# Patient Record
Sex: FEMALE | URBAN - METROPOLITAN AREA
[De-identification: names, ages, dates, MRNs, and addresses within clinical notes are randomized per-mention and may not be internally consistent; named-entity substitution may affect disease eponyms.]

---

## 2020-07-21 ENCOUNTER — TELEPHONE ENCOUNTER (OUTPATIENT)
Dept: URBAN - METROPOLITAN AREA CLINIC 92 | Facility: CLINIC | Age: 45
End: 2020-07-21

## 2021-01-26 ENCOUNTER — WEB ENCOUNTER (OUTPATIENT)
Dept: URBAN - METROPOLITAN AREA CLINIC 80 | Facility: CLINIC | Age: 46
End: 2021-01-26

## 2021-01-27 ENCOUNTER — WEB ENCOUNTER (OUTPATIENT)
Dept: URBAN - METROPOLITAN AREA CLINIC 80 | Facility: CLINIC | Age: 46
End: 2021-01-27

## 2021-04-12 ENCOUNTER — ERX REFILL RESPONSE (OUTPATIENT)
Dept: URBAN - METROPOLITAN AREA CLINIC 96 | Facility: CLINIC | Age: 46
End: 2021-04-12

## 2021-04-12 RX ORDER — MESALAMINE 375 MG/1
TAKE 4 CAPSULES BY MOUTH EVERY MORNING CAPSULE, EXTENDED RELEASE ORAL
Qty: 360 | Refills: 0

## 2021-07-21 ENCOUNTER — ERX REFILL RESPONSE (OUTPATIENT)
Dept: URBAN - METROPOLITAN AREA CLINIC 96 | Facility: CLINIC | Age: 46
End: 2021-07-21

## 2021-07-21 RX ORDER — MESALAMINE 375 MG/1
TAKE 4 CAPSULES BY MOUTH EVERY MORNING 90 CAPSULE, EXTENDED RELEASE ORAL
Qty: 360 CAPSULE | Refills: 1 | OUTPATIENT

## 2021-07-21 RX ORDER — MESALAMINE 375 MG/1
TAKE 4 CAPSULES BY MOUTH EVERY MORNING CAPSULE, EXTENDED RELEASE ORAL
Qty: 360 | Refills: 0 | OUTPATIENT

## 2021-10-25 ENCOUNTER — TELEPHONE ENCOUNTER (OUTPATIENT)
Dept: URBAN - METROPOLITAN AREA CLINIC 79 | Facility: CLINIC | Age: 46
End: 2021-10-25

## 2021-10-25 RX ORDER — MESALAMINE 375 MG/1
TAKE 4 CAPSULES BY MOUTH EVERY MORNING 90 CAPSULE, EXTENDED RELEASE ORAL ONCE A DAY
Qty: 90 | Refills: 0

## 2021-11-15 ENCOUNTER — WEB ENCOUNTER (OUTPATIENT)
Dept: URBAN - METROPOLITAN AREA CLINIC 80 | Facility: CLINIC | Age: 46
End: 2021-11-15

## 2021-11-18 ENCOUNTER — OFFICE VISIT (OUTPATIENT)
Dept: URBAN - METROPOLITAN AREA CLINIC 80 | Facility: CLINIC | Age: 46
End: 2021-11-18
Payer: COMMERCIAL

## 2021-11-18 ENCOUNTER — WEB ENCOUNTER (OUTPATIENT)
Dept: URBAN - METROPOLITAN AREA CLINIC 80 | Facility: CLINIC | Age: 46
End: 2021-11-18

## 2021-11-18 ENCOUNTER — LAB OUTSIDE AN ENCOUNTER (OUTPATIENT)
Dept: URBAN - METROPOLITAN AREA CLINIC 80 | Facility: CLINIC | Age: 46
End: 2021-11-18

## 2021-11-18 DIAGNOSIS — K51.00 ULCERATIVE PANCOLITIS WITHOUT COMPLICATION: ICD-10-CM

## 2021-11-18 PROCEDURE — 99213 OFFICE O/P EST LOW 20 MIN: CPT | Performed by: PHYSICIAN ASSISTANT

## 2021-11-18 RX ORDER — TURMERIC 400 MG
CAPSULE ORAL
Qty: 0 | Refills: 0 | Status: DISCONTINUED | COMMUNITY
Start: 1900-01-01

## 2021-11-18 RX ORDER — SODIUM SULFATE, MAGNESIUM SULFATE, AND POTASSIUM CHLORIDE 17.75; 2.7; 2.25 G/1; G/1; G/1
12 TABLETS TABLET ORAL
Qty: 24 TABLETS | Refills: 0 | OUTPATIENT
Start: 2021-11-18 | End: 2021-11-19

## 2021-11-18 RX ORDER — MESALAMINE 375 MG/1
TAKE 4 CAPSULES BY MOUTH EVERY MORNING 90 CAPSULE, EXTENDED RELEASE ORAL ONCE A DAY
Qty: 90 | Refills: 0 | Status: ACTIVE | COMMUNITY

## 2021-11-18 RX ORDER — MESALAMINE 375 MG/1
TAKE 4 CAPSULES BY MOUTH EVERY MORNING 90 CAPSULE, EXTENDED RELEASE ORAL ONCE A DAY
Qty: 360 | Refills: 3

## 2021-11-18 NOTE — PHYSICAL EXAM CARDIOVASCULAR:
no edema,  no murmurs,  regular rate and rhythm Rhomboid Transposition Flap Text: The defect edges were debeveled with a #15 scalpel blade.  Given the location of the defect and the proximity to free margins a rhomboid transposition flap was deemed most appropriate.  Using a sterile surgical marker, an appropriate rhomboid flap was drawn incorporating the defect.    The area thus outlined was incised deep to adipose tissue with a #15 scalpel blade.  The skin margins were undermined to an appropriate distance in all directions utilizing iris scissors.

## 2021-11-18 NOTE — HPI-TODAY'S VISIT:
Patient is doing well - on Apriso 4 pills a day No abd pain No nausea or emesis No fevers or chills Normal elva habit is 1 BM q few days No BRBPR or melena Overall feeling good Last colon 10/18/18 - recommended repeat in 2 years No recent labs

## 2021-11-19 LAB
A/G RATIO: 1.7
ALBUMIN: 4.3
ALKALINE PHOSPHATASE: 62
ALT (SGPT): 13
AST (SGOT): 20
BASO (ABSOLUTE): 0.1
BASOS: 1
BILIRUBIN, TOTAL: 0.3
BUN/CREATININE RATIO: 17
BUN: 15
C-REACTIVE PROTEIN, QUANT: <1
CALCIUM: 9.2
CARBON DIOXIDE, TOTAL: 25
CHLORIDE: 103
CREATININE: 0.88
EGFR IF AFRICN AM: 90
EGFR IF NONAFRICN AM: 78
EOS (ABSOLUTE): 0.1
EOS: 1
FERRITIN, SERUM: 38
GLOBULIN, TOTAL: 2.5
GLUCOSE: 92
HEMATOCRIT: 40.5
HEMATOLOGY COMMENTS:: (no result)
HEMOGLOBIN: 13.5
IMMATURE CELLS: (no result)
IMMATURE GRANS (ABS): 0
IMMATURE GRANULOCYTES: 0
IRON BIND.CAP.(TIBC): 301
IRON SATURATION: 35
IRON: 105
LYMPHS (ABSOLUTE): 3
LYMPHS: 35
MCH: 31.3
MCHC: 33.3
MCV: 94
MONOCYTES(ABSOLUTE): 0.5
MONOCYTES: 6
NEUTROPHILS (ABSOLUTE): 4.7
NEUTROPHILS: 57
NRBC: (no result)
PLATELETS: 323
POTASSIUM: 4.5
PROTEIN, TOTAL: 6.8
RBC: 4.31
RDW: 12
SODIUM: 141
UIBC: 196
VITAMIN D, 25-HYDROXY: 94.3
WBC: 8.4

## 2021-11-29 ENCOUNTER — WEB ENCOUNTER (OUTPATIENT)
Dept: URBAN - METROPOLITAN AREA CLINIC 80 | Facility: CLINIC | Age: 46
End: 2021-11-29

## 2021-12-07 PROBLEM — 444548001: Status: ACTIVE | Noted: 2021-11-18

## 2022-01-10 ENCOUNTER — OFFICE VISIT (OUTPATIENT)
Dept: URBAN - METROPOLITAN AREA SURGERY CENTER 19 | Facility: SURGERY CENTER | Age: 47
End: 2022-01-10
Payer: COMMERCIAL

## 2022-01-10 ENCOUNTER — CLAIMS CREATED FROM THE CLAIM WINDOW (OUTPATIENT)
Dept: URBAN - METROPOLITAN AREA CLINIC 4 | Facility: CLINIC | Age: 47
End: 2022-01-10
Payer: COMMERCIAL

## 2022-01-10 DIAGNOSIS — K51.00 ACUTE ULCERATIVE PANCOLITIS: ICD-10-CM

## 2022-01-10 DIAGNOSIS — K51.80 OTHER ULCERATIVE COLITIS WITHOUT COMPLICATIONS: ICD-10-CM

## 2022-01-10 PROCEDURE — 45380 COLONOSCOPY AND BIOPSY: CPT | Performed by: INTERNAL MEDICINE

## 2022-01-10 PROCEDURE — 88305 TISSUE EXAM BY PATHOLOGIST: CPT | Performed by: PATHOLOGY

## 2022-01-10 PROCEDURE — G8907 PT DOC NO EVENTS ON DISCHARG: HCPCS | Performed by: INTERNAL MEDICINE

## 2022-01-10 RX ORDER — MESALAMINE 375 MG/1
TAKE 4 CAPSULES BY MOUTH EVERY MORNING 90 CAPSULE, EXTENDED RELEASE ORAL ONCE A DAY
Qty: 360 | Refills: 3 | Status: ACTIVE | COMMUNITY

## 2022-11-30 ENCOUNTER — ERX REFILL RESPONSE (OUTPATIENT)
Dept: URBAN - METROPOLITAN AREA CLINIC 80 | Facility: CLINIC | Age: 47
End: 2022-11-30

## 2022-11-30 RX ORDER — MESALAMINE 375 MG/1
TAKE 4 CAPSULES BY MOUTH EVERY MORNING 90 ORALLY ONCE A DAY 90 DAYS CAPSULE, EXTENDED RELEASE ORAL
Qty: 360 CAPSULE | Refills: 0 | OUTPATIENT

## 2022-11-30 RX ORDER — MESALAMINE 375 MG/1
TAKE 4 CAPSULES BY MOUTH EVERY MORNING 90 ORALLY ONCE A DAY 90 DAYS CAPSULE, EXTENDED RELEASE ORAL
Qty: 360 CAPSULE | Refills: 3 | OUTPATIENT

## 2023-02-27 ENCOUNTER — ERX REFILL RESPONSE (OUTPATIENT)
Dept: URBAN - METROPOLITAN AREA CLINIC 80 | Facility: CLINIC | Age: 48
End: 2023-02-27

## 2023-02-27 RX ORDER — MESALAMINE 375 MG/1
TAKE 4 CAPSULES BY MOUTH EVERY MORNING 90 ORALLY ONCE A DAY 90 DAYS CAPSULE, EXTENDED RELEASE ORAL
Qty: 360 CAPSULE | Refills: 0 | OUTPATIENT

## 2023-06-01 ENCOUNTER — ERX REFILL RESPONSE (OUTPATIENT)
Dept: URBAN - METROPOLITAN AREA CLINIC 80 | Facility: CLINIC | Age: 48
End: 2023-06-01

## 2023-06-01 RX ORDER — MESALAMINE 375 MG/1
TAKE 4 CAPSULES BY MOUTH EVERY MORNING 90 ORALLY ONCE A DAY 90 DAYS CAPSULE, EXTENDED RELEASE ORAL
Qty: 360 CAPSULE | Refills: 0 | OUTPATIENT

## 2023-08-28 ENCOUNTER — ERX REFILL RESPONSE (OUTPATIENT)
Dept: URBAN - METROPOLITAN AREA CLINIC 80 | Facility: CLINIC | Age: 48
End: 2023-08-28

## 2023-08-28 RX ORDER — MESALAMINE 375 MG/1
TAKE 4 CAPSULES BY MOUTH EVERY MORNING 90 ORALLY ONCE A DAY 90 DAYS CAPSULE, EXTENDED RELEASE ORAL
Qty: 360 CAPSULE | Refills: 0 | OUTPATIENT

## 2023-08-28 RX ORDER — MESALAMINE 375 MG/1
TAKE 4 CAPSULES BY MOUTH EVERY MORNING ONCE A DAY 90 DAYS CAPSULE, EXTENDED RELEASE ORAL
Qty: 360 CAPSULE | Refills: 0 | OUTPATIENT

## 2023-10-23 ENCOUNTER — WEB ENCOUNTER (OUTPATIENT)
Dept: URBAN - METROPOLITAN AREA CLINIC 80 | Facility: CLINIC | Age: 48
End: 2023-10-23

## 2023-12-18 ENCOUNTER — WEB ENCOUNTER (OUTPATIENT)
Dept: URBAN - METROPOLITAN AREA CLINIC 80 | Facility: CLINIC | Age: 48
End: 2023-12-18

## 2023-12-18 RX ORDER — MESALAMINE 375 MG/1
TAKE 4 CAPSULES BY MOUTH EVERY MORNING ONCE A DAY 90 DAYS CAPSULE, EXTENDED RELEASE ORAL
Qty: 360 CAPSULE | Refills: 0

## 2024-01-11 ENCOUNTER — DASHBOARD ENCOUNTERS (OUTPATIENT)
Age: 49
End: 2024-01-11

## 2024-01-11 ENCOUNTER — LAB OUTSIDE AN ENCOUNTER (OUTPATIENT)
Dept: URBAN - METROPOLITAN AREA CLINIC 80 | Facility: CLINIC | Age: 49
End: 2024-01-11

## 2024-01-11 ENCOUNTER — OFFICE VISIT (OUTPATIENT)
Dept: URBAN - METROPOLITAN AREA CLINIC 80 | Facility: CLINIC | Age: 49
End: 2024-01-11
Payer: COMMERCIAL

## 2024-01-11 VITALS
SYSTOLIC BLOOD PRESSURE: 138 MMHG | HEART RATE: 77 BPM | TEMPERATURE: 97.2 F | DIASTOLIC BLOOD PRESSURE: 79 MMHG | BODY MASS INDEX: 21.58 KG/M2 | WEIGHT: 126.4 LBS | HEIGHT: 64 IN

## 2024-01-11 DIAGNOSIS — K51.00 ULCERATIVE PANCOLITIS WITHOUT COMPLICATION: ICD-10-CM

## 2024-01-11 PROCEDURE — 99213 OFFICE O/P EST LOW 20 MIN: CPT | Performed by: PHYSICIAN ASSISTANT

## 2024-01-11 RX ORDER — MESALAMINE 375 MG/1
TAKE 4 CAPSULES BY MOUTH EVERY MORNING ONCE A DAY 90 DAYS CAPSULE, EXTENDED RELEASE ORAL ONCE A DAY
Qty: 360 | Refills: 3

## 2024-01-11 RX ORDER — MESALAMINE 375 MG/1
TAKE 4 CAPSULES BY MOUTH EVERY MORNING ONCE A DAY 90 DAYS CAPSULE, EXTENDED RELEASE ORAL
Qty: 360 CAPSULE | Refills: 0 | Status: ACTIVE | COMMUNITY

## 2024-01-11 RX ORDER — SOD SULF/POT CHLORIDE/MAG SULF 1.479 G
12 TABLETS THE FIRST DOSE THE EVENING BEFORE AND SECOND DOSE THE MORNING OF COLONOSCOPY TABLET ORAL TWICE A DAY
Qty: 24 | Refills: 0 | OUTPATIENT
Start: 2024-01-11 | End: 2024-01-12

## 2024-01-11 NOTE — HPI-TODAY'S VISIT:
Pt has ulcerative pan colitis last colon 1/10/2022 -- patchy mild active and inactive colitis throughout bowel She is taking Apriso 4 pills a day no abd pain, no nausea or emesis she is having 1-2 BM a week - changed about 6 months ago - prior was 1 BM a day no BRBPR or melena has not tried any otc meds no new meds, change in diet, etc when it changed no joint pain, no rashes she did not get her flu shot no labs since last spring time

## 2024-01-12 LAB
A/G RATIO: 1.8
ABSOLUTE BASOPHILS: 67
ABSOLUTE EOSINOPHILS: 104
ABSOLUTE LYMPHOCYTES: 2479
ABSOLUTE MONOCYTES: 429
ABSOLUTE NEUTROPHILS: 4322
ALBUMIN: 4.6
ALKALINE PHOSPHATASE: 59
ALT (SGPT): 10
AST (SGOT): 15
BASOPHILS: 0.9
BILIRUBIN, TOTAL: 0.3
BUN/CREATININE RATIO: (no result)
BUN: 19
C-REACTIVE PROTEIN, QUANT: 0.4
CALCIUM: 9.7
CARBON DIOXIDE, TOTAL: 27
CHLORIDE: 101
CREATININE: 0.82
EGFR: 88
EOSINOPHILS: 1.4
FERRITIN, SERUM: 28
GLOBULIN, TOTAL: 2.5
GLUCOSE: 85
HEMATOCRIT: 38.7
HEMOGLOBIN: 13.2
IRON BIND.CAP.(TIBC): 323
IRON SATURATION: 22
IRON: 70
LYMPHOCYTES: 33.5
MCH: 31
MCHC: 34.1
MCV: 90.8
MONOCYTES: 5.8
MPV: 9.9
NEUTROPHILS: 58.4
PLATELET COUNT: 335
POTASSIUM: 4.2
PROTEIN, TOTAL: 7.1
RDW: 12.5
RED BLOOD CELL COUNT: 4.26
SODIUM: 139
VITAMIN D,25-OH,TOTAL,IA: 70
WHITE BLOOD CELL COUNT: 7.4

## 2024-01-31 ENCOUNTER — WEB ENCOUNTER (OUTPATIENT)
Dept: URBAN - METROPOLITAN AREA CLINIC 80 | Facility: CLINIC | Age: 49
End: 2024-01-31

## 2024-02-07 ENCOUNTER — COLON (OUTPATIENT)
Dept: URBAN - METROPOLITAN AREA SURGERY CENTER 19 | Facility: SURGERY CENTER | Age: 49
End: 2024-02-07

## 2024-02-22 ENCOUNTER — APPOINTMENT (RX ONLY)
Dept: URBAN - METROPOLITAN AREA CLINIC 159 | Facility: CLINIC | Age: 49
Setting detail: DERMATOLOGY
End: 2024-02-22

## 2024-02-22 DIAGNOSIS — L82.1 OTHER SEBORRHEIC KERATOSIS: ICD-10-CM

## 2024-02-22 DIAGNOSIS — D18.0 HEMANGIOMA: ICD-10-CM

## 2024-02-22 DIAGNOSIS — L73.8 OTHER SPECIFIED FOLLICULAR DISORDERS: ICD-10-CM

## 2024-02-22 DIAGNOSIS — L81.4 OTHER MELANIN HYPERPIGMENTATION: ICD-10-CM

## 2024-02-22 DIAGNOSIS — L82.0 INFLAMED SEBORRHEIC KERATOSIS: ICD-10-CM

## 2024-02-22 DIAGNOSIS — D22 MELANOCYTIC NEVI: ICD-10-CM

## 2024-02-22 PROBLEM — D18.01 HEMANGIOMA OF SKIN AND SUBCUTANEOUS TISSUE: Status: ACTIVE | Noted: 2024-02-22

## 2024-02-22 PROBLEM — D22.5 MELANOCYTIC NEVI OF TRUNK: Status: ACTIVE | Noted: 2024-02-22

## 2024-02-22 PROCEDURE — ? COUNSELING

## 2024-02-22 PROCEDURE — 17110 DESTRUCTION B9 LES UP TO 14: CPT

## 2024-02-22 PROCEDURE — ? ADDITIONAL NOTES

## 2024-02-22 PROCEDURE — ? LIQUID NITROGEN

## 2024-02-22 PROCEDURE — 99213 OFFICE O/P EST LOW 20 MIN: CPT | Mod: 25

## 2024-02-22 ASSESSMENT — LOCATION ZONE DERM
LOCATION ZONE: FACE
LOCATION ZONE: TRUNK

## 2024-02-22 ASSESSMENT — LOCATION DETAILED DESCRIPTION DERM
LOCATION DETAILED: INFERIOR MID FOREHEAD
LOCATION DETAILED: EPIGASTRIC SKIN
LOCATION DETAILED: LEFT FOREHEAD
LOCATION DETAILED: RIGHT MID-UPPER BACK
LOCATION DETAILED: RIGHT SUPERIOR UPPER BACK
LOCATION DETAILED: LEFT INFERIOR MEDIAL UPPER BACK

## 2024-02-22 ASSESSMENT — LOCATION SIMPLE DESCRIPTION DERM
LOCATION SIMPLE: LEFT UPPER BACK
LOCATION SIMPLE: ABDOMEN
LOCATION SIMPLE: LEFT FOREHEAD
LOCATION SIMPLE: INFERIOR FOREHEAD
LOCATION SIMPLE: RIGHT UPPER BACK

## 2024-02-22 NOTE — PROCEDURE: LIQUID NITROGEN
Show Spray Paint Technique Variable?: Yes
Post-Care Instructions: I reviewed with the patient in detail post-care instructions. Patient is to wear sunprotection, and avoid picking at any of the treated lesions. Pt may apply Vaseline to crusted or scabbing areas.
Detail Level: Detailed
Medical Necessity Clause: This procedure was medically necessary because the lesions that were treated were:
Medical Necessity Information: It is in your best interest to select a reason for this procedure from the list below. All of these items fulfill various CMS LCD requirements except the new and changing color options.
Spray Paint Text: The liquid nitrogen was applied to the skin utilizing a spray paint frosting technique.
Add 52 Modifier (Optional): no
Consent: The patient's consent was obtained including but not limited to risks of crusting, scabbing, blistering, scarring, darker or lighter pigmentary change, recurrence, incomplete removal and infection.

## 2024-02-22 NOTE — PROCEDURE: COUNSELING
Sunscreen Recommendation Label Override: Mineral based SPF 30+ recommended daily
Detail Level: Generalized
Detail Level: Detailed
Detail Level: Zone

## 2024-03-14 ENCOUNTER — LAB (OUTPATIENT)
Dept: URBAN - METROPOLITAN AREA CLINIC 4 | Facility: CLINIC | Age: 49
End: 2024-03-14
Payer: COMMERCIAL

## 2024-03-14 ENCOUNTER — COLON (OUTPATIENT)
Dept: URBAN - METROPOLITAN AREA SURGERY CENTER 19 | Facility: SURGERY CENTER | Age: 49
End: 2024-03-14
Payer: COMMERCIAL

## 2024-03-14 DIAGNOSIS — K51.00 ULCERATIVE PANCOLITIS WITHOUT COMPLICATION: ICD-10-CM

## 2024-03-14 DIAGNOSIS — K63.89 OTHER SPECIFIED DISEASES OF INTESTINE: ICD-10-CM

## 2024-03-14 DIAGNOSIS — K51.919 ULCERATIVE COLITIS, UNSPECIFIED WITH UNSPECIFIED COMPLICATIONS: ICD-10-CM

## 2024-03-14 PROCEDURE — 45380 COLONOSCOPY AND BIOPSY: CPT | Performed by: INTERNAL MEDICINE

## 2024-03-14 PROCEDURE — 88341 IMHCHEM/IMCYTCHM EA ADD ANTB: CPT | Performed by: PATHOLOGY

## 2024-03-14 PROCEDURE — 88342 IMHCHEM/IMCYTCHM 1ST ANTB: CPT | Performed by: PATHOLOGY

## 2024-03-14 PROCEDURE — 88305 TISSUE EXAM BY PATHOLOGIST: CPT | Performed by: PATHOLOGY

## 2024-03-14 RX ORDER — MESALAMINE 375 MG/1
TAKE 4 CAPSULES BY MOUTH EVERY MORNING ONCE A DAY 90 DAYS CAPSULE, EXTENDED RELEASE ORAL ONCE A DAY
Qty: 360 | Refills: 3 | Status: ACTIVE | COMMUNITY

## 2024-08-05 NOTE — PROCEDURE: ADDITIONAL NOTES
Detail Level: Simple
Render Risk Assessment In Note?: no
Additional Notes: $200 cosmetic quote for electrocaudery, pt to come in 30 min before for topical numbing.
[email or web address]:[id number]],[[email or web address]_ALL_OB_FT:[id number]],[[email or web address]_ALL_OB_FT:[id number]]

## 2025-01-07 ENCOUNTER — OFFICE VISIT (OUTPATIENT)
Dept: URBAN - METROPOLITAN AREA CLINIC 80 | Facility: CLINIC | Age: 50
End: 2025-01-07
Payer: COMMERCIAL

## 2025-01-07 VITALS
HEIGHT: 64 IN | WEIGHT: 130 LBS | SYSTOLIC BLOOD PRESSURE: 108 MMHG | TEMPERATURE: 97.5 F | BODY MASS INDEX: 22.2 KG/M2 | DIASTOLIC BLOOD PRESSURE: 70 MMHG

## 2025-01-07 DIAGNOSIS — K51.00 ULCERATIVE PANCOLITIS WITHOUT COMPLICATION: ICD-10-CM

## 2025-01-07 PROCEDURE — 99214 OFFICE O/P EST MOD 30 MIN: CPT | Performed by: PHYSICIAN ASSISTANT

## 2025-01-07 RX ORDER — MESALAMINE 375 MG/1
TAKE 4 CAPSULES BY MOUTH EVERY MORNING ONCE A DAY 90 DAYS CAPSULE, EXTENDED RELEASE ORAL ONCE A DAY
Qty: 360 | Refills: 3
End: 2026-01-02

## 2025-01-07 RX ORDER — MESALAMINE 375 MG/1
TAKE 4 CAPSULES BY MOUTH EVERY MORNING ONCE A DAY 90 DAYS CAPSULE, EXTENDED RELEASE ORAL ONCE A DAY
Qty: 360 | Refills: 3 | Status: ACTIVE | COMMUNITY

## 2025-01-07 NOTE — HPI-TODAY'S VISIT:
Pts last colon 3/2024 -- left sided UC - recommended follow up colon in 2 years Pt taking Apriso 4 pills a day she is having 1 BM q2days no BRBPR or melena no nausea or emesis no fevers or chills no joint pain she did not get the flu shot no rashes no recent blood work

## 2025-01-08 LAB
A/G RATIO: 1.8
ABSOLUTE BASOPHILS: 73
ABSOLUTE EOSINOPHILS: 112
ABSOLUTE LYMPHOCYTES: 2185
ABSOLUTE MONOCYTES: 317
ABSOLUTE NEUTROPHILS: 3914
ALBUMIN: 4.8
ALKALINE PHOSPHATASE: 66
ALT (SGPT): 10
AST (SGOT): 20
BASOPHILS: 1.1
BILIRUBIN, TOTAL: 0.3
BUN/CREATININE RATIO: (no result)
BUN: 20
C-REACTIVE PROTEIN, QUANT: <3
CALCIUM: 9.6
CARBON DIOXIDE, TOTAL: 27
CHLORIDE: 101
CREATININE: 0.84
EGFR: 85
EOSINOPHILS: 1.7
FERRITIN, SERUM: 56
GLOBULIN, TOTAL: 2.7
GLUCOSE: 91
HEMATOCRIT: 41.2
HEMOGLOBIN: 13.7
IRON BIND.CAP.(TIBC): 324
IRON SATURATION: 27
IRON: 86
LYMPHOCYTES: 33.1
MCH: 30.2
MCHC: 33.3
MCV: 90.7
MONOCYTES: 4.8
MPV: 9.8
NEUTROPHILS: 59.3
PLATELET COUNT: 364
POTASSIUM: 4.4
PROTEIN, TOTAL: 7.5
RDW: 12
RED BLOOD CELL COUNT: 4.54
SODIUM: 139
VITAMIN D,25-OH,TOTAL,IA: 82
WHITE BLOOD CELL COUNT: 6.6

## 2025-01-13 ENCOUNTER — WEB ENCOUNTER (OUTPATIENT)
Dept: URBAN - METROPOLITAN AREA CLINIC 80 | Facility: CLINIC | Age: 50
End: 2025-01-13

## 2025-01-13 RX ORDER — MESALAMINE 375 MG/1
TAKE 4 CAPSULES BY MOUTH EVERY MORNING ONCE A DAY 90 DAYS CAPSULE, EXTENDED RELEASE ORAL ONCE A DAY
Qty: 360 | Refills: 3
End: 2026-01-08

## 2025-01-15 ENCOUNTER — WEB ENCOUNTER (OUTPATIENT)
Dept: URBAN - METROPOLITAN AREA CLINIC 80 | Facility: CLINIC | Age: 50
End: 2025-01-15

## 2025-01-16 ENCOUNTER — APPOINTMENT (OUTPATIENT)
Dept: URBAN - METROPOLITAN AREA CLINIC 159 | Facility: CLINIC | Age: 50
Setting detail: DERMATOLOGY
End: 2025-01-16

## 2025-01-16 DIAGNOSIS — L73.8 OTHER SPECIFIED FOLLICULAR DISORDERS: ICD-10-CM

## 2025-01-16 DIAGNOSIS — D22 MELANOCYTIC NEVI: ICD-10-CM

## 2025-01-16 DIAGNOSIS — L81.4 OTHER MELANIN HYPERPIGMENTATION: ICD-10-CM

## 2025-01-16 DIAGNOSIS — D18.0 HEMANGIOMA: ICD-10-CM

## 2025-01-16 DIAGNOSIS — L82.1 OTHER SEBORRHEIC KERATOSIS: ICD-10-CM

## 2025-01-16 PROBLEM — D18.01 HEMANGIOMA OF SKIN AND SUBCUTANEOUS TISSUE: Status: ACTIVE | Noted: 2025-01-16

## 2025-01-16 PROBLEM — D22.5 MELANOCYTIC NEVI OF TRUNK: Status: ACTIVE | Noted: 2025-01-16

## 2025-01-16 PROCEDURE — ? PRESCRIPTION

## 2025-01-16 PROCEDURE — 99213 OFFICE O/P EST LOW 20 MIN: CPT

## 2025-01-16 PROCEDURE — ? COUNSELING

## 2025-01-16 PROCEDURE — ? ADDITIONAL NOTES

## 2025-01-16 RX ORDER — TRETIONIN 0.5 MG/G
CREAM TOPICAL
Qty: 45 | Refills: 0 | Status: ERX | COMMUNITY
Start: 2025-01-16

## 2025-01-16 RX ADMIN — TRETIONIN: 0.5 CREAM TOPICAL at 00:00

## 2025-01-16 ASSESSMENT — LOCATION SIMPLE DESCRIPTION DERM
LOCATION SIMPLE: LEFT UPPER BACK
LOCATION SIMPLE: LEFT CHEEK
LOCATION SIMPLE: UPPER BACK
LOCATION SIMPLE: CHEST

## 2025-01-16 ASSESSMENT — LOCATION DETAILED DESCRIPTION DERM
LOCATION DETAILED: LEFT INFERIOR MEDIAL MALAR CHEEK
LOCATION DETAILED: SUPERIOR THORACIC SPINE
LOCATION DETAILED: LOWER STERNUM
LOCATION DETAILED: LEFT INFERIOR UPPER BACK
LOCATION DETAILED: RIGHT LATERAL SUPERIOR CHEST

## 2025-01-16 ASSESSMENT — LOCATION ZONE DERM
LOCATION ZONE: TRUNK
LOCATION ZONE: FACE

## 2025-01-16 NOTE — PROCEDURE: ADDITIONAL NOTES
Render Risk Assessment In Note?: no
Detail Level: Simple
Additional Notes: Recommended Retin-A mix with CeraVe moisturizing cream/ lotion.

## 2025-01-16 NOTE — HPI: FULL BODY SKIN EXAMINATION
What Is The Reason For Today's Visit?: Full Body Skin Examination
What Is The Reason For Today's Visit? (Being Monitored For X): concerning skin lesions on an annual basis
What Type Of Note Output Would You Prefer (Optional)?: Standard Output
Additional History: Patient presents for a full body skin check with concerns of a spot on her back. Patient also wants to address her sebaceous hyperplasia treated with OTC medication 2 years ago at a different facility  (unable to remember the name of medication) wants to discuss treatment options.

## 2025-01-24 ENCOUNTER — LAB OUTSIDE AN ENCOUNTER (OUTPATIENT)
Dept: URBAN - METROPOLITAN AREA CLINIC 80 | Facility: CLINIC | Age: 50
End: 2025-01-24

## 2025-01-28 LAB — CALPROTECTIN, STOOL - QDX: (no result)

## 2025-01-29 ENCOUNTER — TELEPHONE ENCOUNTER (OUTPATIENT)
Dept: URBAN - METROPOLITAN AREA CLINIC 80 | Facility: CLINIC | Age: 50
End: 2025-01-29

## 2025-03-10 ENCOUNTER — ERX REFILL RESPONSE (OUTPATIENT)
Dept: URBAN - METROPOLITAN AREA CLINIC 80 | Facility: CLINIC | Age: 50
End: 2025-03-10

## 2025-03-10 RX ORDER — MESALAMINE 375 MG/1
TAKE 4 CAPSULES BY MOUTH EVERY MORNING ONCE A DAY 90 DAYS CAPSULE, EXTENDED RELEASE ORAL
Qty: 360 CAPSULE | Refills: 0 | OUTPATIENT

## 2025-05-12 ENCOUNTER — WEB ENCOUNTER (OUTPATIENT)
Dept: URBAN - METROPOLITAN AREA CLINIC 80 | Facility: CLINIC | Age: 50
End: 2025-05-12

## 2025-05-12 RX ORDER — MESALAMINE 375 MG/1
TAKE 4 CAPSULES BY MOUTH EVERY MORNING ONCE A DAY 90 DAYS CAPSULE, EXTENDED RELEASE ORAL ONCE A DAY
Qty: 360 CAPSULE | Refills: 2

## 2025-07-14 ENCOUNTER — APPOINTMENT (OUTPATIENT)
Dept: URBAN - METROPOLITAN AREA CLINIC 162 | Facility: CLINIC | Age: 50
Setting detail: DERMATOLOGY
End: 2025-07-14

## 2025-07-14 DIAGNOSIS — Z41.9 ENCOUNTER FOR PROCEDURE FOR PURPOSES OTHER THAN REMEDYING HEALTH STATE, UNSPECIFIED: ICD-10-CM

## 2025-07-14 PROCEDURE — ? COSMETIC CONSULTATION: AGING FACE

## 2025-07-14 ASSESSMENT — LOCATION DETAILED DESCRIPTION DERM: LOCATION DETAILED: LEFT CENTRAL MALAR CHEEK

## 2025-07-14 ASSESSMENT — LOCATION ZONE DERM: LOCATION ZONE: FACE

## 2025-07-14 ASSESSMENT — LOCATION SIMPLE DESCRIPTION DERM: LOCATION SIMPLE: LEFT CHEEK

## 2025-07-14 NOTE — PROCEDURE: COSMETIC CONSULTATION: AGING FACE
Detail Level: Zone
Patient Specific Counseling (Will Not Stick From Patient To Patient): -States she's oily in the T-zone, hx sebaeous hyperplasias on forehead. ***Refer to Derm for cosmetic tx (hyfrecator). Pt aware there will be a cosmetic fee.\\n\\n-Resurfx - Nose (2) $150 each tx, Microneedling - Face, advised to start with (4).\\n\\n-Rec tret 0.05% now.  Pt has otc skincare at home. Rec adding HA Immerse, and Barron advanced when time replenish her products.